# Patient Record
Sex: FEMALE | Race: WHITE | URBAN - METROPOLITAN AREA
[De-identification: names, ages, dates, MRNs, and addresses within clinical notes are randomized per-mention and may not be internally consistent; named-entity substitution may affect disease eponyms.]

---

## 2020-10-29 ENCOUNTER — HOSPITAL ENCOUNTER (OUTPATIENT)
Dept: PHYSICAL THERAPY | Age: 50
Setting detail: THERAPIES SERIES
Discharge: HOME | End: 2020-10-29
Payer: COMMERCIAL

## 2020-10-29 PROCEDURE — 97110 THERAPEUTIC EXERCISES: CPT | Performed by: PHYSICAL THERAPIST

## 2020-10-29 PROCEDURE — 97162 PT EVAL MOD COMPLEX 30 MIN: CPT | Performed by: PHYSICAL THERAPIST

## 2020-10-29 NOTE — FLOWSHEET NOTE
stretch 20\" 3 x HEP                     Supine cane flexion  5\"  10 x  HEP   Chin tuck- supine 5\" 10 x  HEP   Supine cervical ext 5\"  10 x  HEP   Cervical supine rotation 5 10 x HEP               Manual Intervention           Cerv mobs/manip      Thoracic mobs/manip      CT manip      Rib mobilizations      STM                  NMR re-education          T-spine Ext- foam roll      Chin tucks                         Traction                                     Therapeutic Exercise and NMR EXR  [x] (03920) Provided verbal/tactile cueing for activities related to strengthening, flexibility, endurance, ROM  for improvements in cervical, postural, scapular, scapulothoracic and UE control with self care, reaching, carrying, lifting, house/yardwork, driving/computer work. [x] (96828) Provided verbal/tactile cueing for activities related to improving balance, coordination, kinesthetic sense, posture, motor skill, proprioception  to assist with cervical, scapular, scapulothoracic and UE control with self care, reaching, carrying, lifting, house/yardwork, driving/computer work. Therapeutic Activities:    [] (55269 or 52142) Provided verbal/tactile cueing for activities related to improving balance, coordination, kinesthetic sense, posture, motor skill, proprioception and motor activation to allow for proper function of cervical, scapular, scapulothoracic and UE control with self care, carrying, lifting, driving/computer work.      Home Exercise Program:    [x] (19380) Reviewed/Progressed HEP activities related to strengthening, flexibility, endurance, ROM of cervical, scapular, scapulothoracic and UE control with self care, reaching, carrying, lifting, house/yardwork, driving/computer work  [] (20516) Reviewed/Progressed HEP activities related to improving balance, coordination, kinesthetic sense, posture, motor skill, proprioception of cervical, scapular, scapulothoracic and UE control with self care, reaching, carrying, lifting, house/yardwork, driving/computer work      Manual Treatments:  PROM / STM / Oscillations-Mobs:  G-I, II, III, IV (PA's, Inf., Post.)  [x] (98195) Provided manual therapy to mobilize soft tissue/joints of cervical/CT, scapular GHJ and UE for the purpose of decreasing headache, modulating pain, promoting relaxation,  increasing ROM, reducing/eliminating soft tissue swelling/inflammation/restriction, improving soft tissue extensibility and allowing for proper ROM for normal function with self care, reaching, carrying, lifting, house/yardwork, driving/computer work    Modalities:  Not today due to time  (patient had to  children)    Charges  Timed Code Treatment Minutes: 20   Total Treatment Minutes: 45       [] EVAL (LOW) 97210   [x] EVAL (MOD) 12710   [] EVAL (HIGH) 27739   [] RE-EVAL     [x] ES(15191) x  1   [] IONTO  [] NMR (26666) x     [] VASO  [] Manual (60993) x      [] Other:  [] TA x      [] Mech Traction (29079)  [] ES(attended) (81496)      [] ES (un) (85641):     GOALS:  Patient stated goal: Decrease in pain   []? Progressing: []? Met: []? Not Met: []? Adjusted     Therapist goals for Patient:   Short Term Goals: To be achieved in: 2 weeks  1. Independent in HEP and progression per patient tolerance, in order to prevent re-injury. []? Progressing: []? Met: []? Not Met: []? Adjusted  2. Patient will have a decrease in pain to facilitate improvement in movement, function, and ADLs as indicated by Functional Deficits. []? Progressing: []? Met: []? Not Met: []? Adjusted     Long Term Goals: To be achieved in: 6 weeks  1. Disability index score of 30% or less for the NDI to assist with reaching prior level of function. []? Progressing: []? Met: []? Not Met: []? Adjusted  2. Patient will demonstrate increased AROM to [facility] of cervical/thoracic spine to allow for proper joint functioning as indicated by patients Functional Deficits. []? Progressing: []? Met: []?  Not Met: []? Adjusted  3. Patient will demonstrate an increase in postural awareness and control and activation of  Deep cervical stabilizers to allow for proper functional mobility as indicated by patients Functional Deficits. []? Progressing: []? Met: []? Not Met: []? Adjusted   4. Patient will return to work and functional activities without increased symptoms or restriction. []? Progressing: []? Met: []? Not Met: []? Adjusted  5. Patient will sleep 6-8 hours without sleep disturbances. []? Progressing: []? Met: []? Not Met: []? Adjusted           Overall Progression Towards Functional goals/ Treatment Progress Update:  [] Patient is progressing as expected towards functional goals listed. [] Progression is slowed due to complexities/Impairments listed. [] Progression has been slowed due to co-morbidities. [x] Plan just implemented, too soon to assess goals progression <30days   [] Goals require adjustment due to lack of progress  [] Patient is not progressing as expected and requires additional follow up with physician  [] Other    Prognosis for POC: [x] Good [] Fair  [] Poor      Patient requires continued skilled intervention: [x] Yes  [] No      ASSESSMENT:  See eval    Treatment/Activity Tolerance:  [] Patient tolerated treatment well [] Patient limited by fatique  [x] Patient limited by pain  [] Patient limited by other medical complications  [] Other:     Prognosis: [] Good [x] Fair  [] Poor    Patient Requires Follow-up: [x] Yes  [] No    PLAN: See eval  [] Continue per plan of care [] Alter current plan (see comments above)  [x] Plan of care initiated [] Hold pending MD visit [] Discharge      Electronically signed by:  Kenny Flores PT     Note: If patient does not return for scheduled/ recommended follow up visits, this note will serve as a discharge from care along with most recent update on progress.
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
## 2020-11-02 ENCOUNTER — HOSPITAL ENCOUNTER (OUTPATIENT)
Dept: PHYSICAL THERAPY | Age: 50
Setting detail: THERAPIES SERIES
Discharge: HOME | End: 2020-11-02
Payer: COMMERCIAL

## 2020-11-02 PROCEDURE — 97110 THERAPEUTIC EXERCISES: CPT | Performed by: PHYSICAL THERAPIST

## 2020-11-02 PROCEDURE — G0283 ELEC STIM OTHER THAN WOUND: HCPCS | Performed by: PHYSICAL THERAPIST

## 2020-11-02 PROCEDURE — 97140 MANUAL THERAPY 1/> REGIONS: CPT | Performed by: PHYSICAL THERAPIST

## 2020-11-02 NOTE — FLOWSHEET NOTE
[facility],  [address]  Phone: [phone]  Fax [phone]      Physical Therapy Treatment Note/ Progress Report:       Date:  2020    Patient Name:  Marisela Richter    :  1970  MRN: [id number]  Restrictions/Precautions:    Medical/Treatment Diagnosis Information:  · Diagnosis: S16.1xxA  Cervical Strain, Muscle Fasica, tendons  · Treatment Diagnosis: Neck Pain H99.1  Insurance/Certification information:  PT Insurance Information: [employer] $70CP, Auth after 10  Physician Information:  Referring Practitioner: Char Paredes NP;  [facility]  Has the plan of care been signed (Y/N):        []  Yes  [x]  No     Date of Patient follow up with Physician:     Is this a Progress Report:     []  Yes  [x]  No        If Yes:  Date Range for reporting period:  Beginning 10/29/20  Ending    Progress report will be due (10 Rx or 30 days whichever is less):        Recertification will be due (POC Duration  / 90 days whichever is less): 6 week POC  12/10/20       Visit # Insurance Allowable Auth Required   In-person 2 10 (then auth) [x]  Yes []  No    Telehealth   []  Yes []  No    Total        Functional Scale: NDI 66%    Date assessed:  10/29/20   Therapy Diagnosis/Practice Pattern:F      Number of Comorbidities:  []0     []1-2    [x]3+     Latex Allergy:  [x]NO      []YES  Preferred Language for Healthcare:   [x]English       []other:    Pain level:  6-7/10     SUBJECTIVE:  Base of neck sore Friday. Hard to touch rest of the day. Symptoms remain the same otherwise. OBJECTIVE: See eval   Observation:    Test measurements:      RESTRICTIONS/PRECAUTIONS: 2009 Cervical Fusion;  Ehler's Danlos Syndrome (meds), HTN (controlled), Mitral valve Prolaspse, RA, LATEX ALLERGY    Exercises/Interventions:   Therapeutic Ex        Sets/sec Reps Notes   UBE      T- band Row/pinch RTB 15 x  Latex free T- band lower pinch RTB 15 x  Latex free    T- band ER activation with scap retract RTB 5\"  15 x  HEP   UT stretch 20\" 3 x HEP   Levator stretch 20\" 3 x HEP         Supine SA punch  20 x     Supine Horz ABD RTB 15 x  Latex free    Supine cane flexion  5\"  10 x  HEP   Chin tuck- supine 5\" 10 x  HEP   Supine cervical ext over towel roll 5\"  10 x  HEP   Cervical supine rotation 5 10 x HEP               Manual Intervention           Suboccipital massage/ release  X 3 min     STM to upper trap, levators, paraspinals  X 4 min     Cervical stretches  X 3 min     Thoracic spine mobs - prone  nv                       NMR re-education                                        Traction                                     Therapeutic Exercise and NMR EXR  [x] (30135) Provided verbal/tactile cueing for activities related to strengthening, flexibility, endurance, ROM  for improvements in cervical, postural, scapular, scapulothoracic and UE control with self care, reaching, carrying, lifting, house/yardwork, driving/computer work. [x] (85665) Provided verbal/tactile cueing for activities related to improving balance, coordination, kinesthetic sense, posture, motor skill, proprioception  to assist with cervical, scapular, scapulothoracic and UE control with self care, reaching, carrying, lifting, house/yardwork, driving/computer work. Therapeutic Activities:    [] (25192 or 56316) Provided verbal/tactile cueing for activities related to improving balance, coordination, kinesthetic sense, posture, motor skill, proprioception and motor activation to allow for proper function of cervical, scapular, scapulothoracic and UE control with self care, carrying, lifting, driving/computer work.      Home Exercise Program:    [x] (03939) Reviewed/Progressed HEP activities related to strengthening, flexibility, endurance, ROM of cervical, scapular, scapulothoracic and UE control with self care, reaching, carrying, lifting, house/yardwork, driving/computer work  [] (93065) Reviewed/Progressed HEP activities related to improving balance, coordination, kinesthetic sense, posture, motor skill, proprioception of cervical, scapular, scapulothoracic and UE control with self care, reaching, carrying, lifting, house/yardwork, driving/computer work      Manual Treatments:  PROM / STM / Oscillations-Mobs:  G-I, II, III, IV (PA's, Inf., Post.)  [x] (97036) Provided manual therapy to mobilize soft tissue/joints of cervical/CT, scapular GHJ and UE for the purpose of decreasing headache, modulating pain, promoting relaxation,  increasing ROM, reducing/eliminating soft tissue swelling/inflammation/restriction, improving soft tissue extensibility and allowing for proper ROM for normal function with self care, reaching, carrying, lifting, house/yardwork, driving/computer work    Modalities:  PreMod to cervical spine / MHP x 15 minutes prone     Charges  Timed Code Treatment Minutes: 40   Total Treatment Minutes: 55       [] EVAL (LOW) 00271   [] EVAL (MOD) 10960   [] EVAL (HIGH) 59020   [] RE-EVAL     [x] SL(54258) x  2   [] IONTO  [] NMR (54201) x     [] VASO  [x] Manual (50188) x  1    [] Other:  [] TA x      [] Mech Traction (27506)  [] ES(attended) (99699)      [x] ES (un) (00983):     GOALS:  Patient stated goal: Decrease in pain   []? Progressing: []? Met: []? Not Met: []? Adjusted     Therapist goals for Patient:   Short Term Goals: To be achieved in: 2 weeks  1. Independent in HEP and progression per patient tolerance, in order to prevent re-injury. []? Progressing: []? Met: []? Not Met: []? Adjusted  2. Patient will have a decrease in pain to facilitate improvement in movement, function, and ADLs as indicated by Functional Deficits. []? Progressing: []? Met: []? Not Met: []? Adjusted     Long Term Goals: To be achieved in: 6 weeks  1. Disability index score of 30% or less for the NDI to assist with reaching prior level of function. []?  Progressing: []? Met: []? Not Met: []? Adjusted  2. Patient will demonstrate increased AROM to [facility] of cervical/thoracic spine to allow for proper joint functioning as indicated by patients Functional Deficits. []? Progressing: []? Met: []? Not Met: []? Adjusted  3. Patient will demonstrate an increase in postural awareness and control and activation of  Deep cervical stabilizers to allow for proper functional mobility as indicated by patients Functional Deficits. []? Progressing: []? Met: []? Not Met: []? Adjusted   4. Patient will return to work and functional activities without increased symptoms or restriction. []? Progressing: []? Met: []? Not Met: []? Adjusted  5. Patient will sleep 6-8 hours without sleep disturbances. []? Progressing: []? Met: []? Not Met: []? Adjusted           Overall Progression Towards Functional goals/ Treatment Progress Update:  [] Patient is progressing as expected towards functional goals listed. [] Progression is slowed due to complexities/Impairments listed. [] Progression has been slowed due to co-morbidities. [x] Plan just implemented, too soon to assess goals progression <30days   [] Goals require adjustment due to lack of progress  [] Patient is not progressing as expected and requires additional follow up with physician  [] Other    Prognosis for POC: [x] Good [] Fair  [] Poor      Patient requires continued skilled intervention: [x] Yes  [] No      ASSESSMENT:Tolerated additional strengthening well for scapular stabilization. Placed order for TENS unit. Relief with modality in clinic today.      Treatment/Activity Tolerance:  [] Patient tolerated treatment well [] Patient limited by fatique  [x] Patient limited by pain  [] Patient limited by other medical complications  [] Other:     Prognosis: [] Good [x] Fair  [] Poor    Patient Requires Follow-up: [x] Yes  [] No    PLAN: See eval  [x] Continue per plan of care [] Alter current plan (see comments above)  [] Plan of care initiated [] Hold pending MD visit [] Discharge      Electronically signed by:  aJme Bermudez PT     Note: If patient does not return for scheduled/ recommended follow up visits, this note will serve as a discharge from care along with most recent update on progress.

## 2020-11-09 ENCOUNTER — HOSPITAL ENCOUNTER (OUTPATIENT)
Dept: PHYSICAL THERAPY | Age: 50
Setting detail: THERAPIES SERIES
Discharge: HOME | End: 2020-11-09
Payer: COMMERCIAL

## 2020-11-09 PROCEDURE — 97140 MANUAL THERAPY 1/> REGIONS: CPT | Performed by: PHYSICAL THERAPIST

## 2020-11-09 PROCEDURE — 97110 THERAPEUTIC EXERCISES: CPT | Performed by: PHYSICAL THERAPIST

## 2020-11-09 PROCEDURE — G0283 ELEC STIM OTHER THAN WOUND: HCPCS | Performed by: PHYSICAL THERAPIST

## 2020-11-09 NOTE — FLOWSHEET NOTE
[facility],  [address] [place]  Phone: [phone]  Fax [phone]      Physical Therapy Treatment Note/ Progress Report:       Date:  2020    Patient Name:  Cece Boggs    :  1970  MRN: [id number]  Restrictions/Precautions:    Medical/Treatment Diagnosis Information:  · Diagnosis: S16.1xxA  Cervical Strain, Muscle Fasica, tendons  · Treatment Diagnosis: Neck Pain C49.6  Insurance/Certification information:  PT Insurance Information: [employer] $70CP, Auth after 10  Physician Information:  Referring Practitioner: Ciro Davis NP;  [facility]  Has the plan of care been signed (Y/N):        []  Yes  [x]  No     Date of Patient follow up with Physician:     Is this a Progress Report:     []  Yes  [x]  No        If Yes:  Date Range for reporting period:  Beginning 10/29/20  Ending    Progress report will be due (10 Rx or 30 days whichever is less):        Recertification will be due (POC Duration  / 90 days whichever is less): 6 week POC  12/10/20       Visit # Insurance Allowable Auth Required   In-person 3 10 (then auth) [x]  Yes []  No    Telehealth   []  Yes []  No    Total        Functional Scale: NDI 66%    Date assessed:  10/29/20   Therapy Diagnosis/Practice Pattern:F      Number of Comorbidities:  []0     []1-2    [x]3+     Latex Allergy:  [x]NO      []YES  Preferred Language for Healthcare:   [x]English       []other:    Pain level:  4-5/10     SUBJECTIVE:  Feeling less pain in the shoulders and neck, more soreness now. Eager to get TENS unit. Had procedure last week and arm got sore from bed mobility and blood draw, etc.     OBJECTIVE: See eval   Observation:    Test measurements:       RESTRICTIONS/PRECAUTIONS:  Cervical Fusion;  Ehler's Danlos Syndrome (meds), HTN (controlled), Mitral valve Prolaspse, RA, LATEX ALLERGY    Exercises/Interventions:   Therapeutic Ex flexibility, endurance, ROM of cervical, scapular, scapulothoracic and UE control with self care, reaching, carrying, lifting, house/yardwork, driving/computer work  [] (06718) Reviewed/Progressed HEP activities related to improving balance, coordination, kinesthetic sense, posture, motor skill, proprioception of cervical, scapular, scapulothoracic and UE control with self care, reaching, carrying, lifting, house/yardwork, driving/computer work      Manual Treatments:  PROM / STM / Oscillations-Mobs:  G-I, II, III, IV (PA's, Inf., Post.)  [x] (78019) Provided manual therapy to mobilize soft tissue/joints of cervical/CT, scapular GHJ and UE for the purpose of decreasing headache, modulating pain, promoting relaxation,  increasing ROM, reducing/eliminating soft tissue swelling/inflammation/restriction, improving soft tissue extensibility and allowing for proper ROM for normal function with self care, reaching, carrying, lifting, house/yardwork, driving/computer work    Modalities:  PreMod to cervical spine / MHP x 15 minutes prone     Charges  Timed Code Treatment Minutes: 40   Total Treatment Minutes: 55       [] EVAL (LOW) 64379   [] EVAL (MOD) 28093   [] EVAL (HIGH) 17253   [] RE-EVAL     [x] AX(79107) x  2   [] IONTO  [] NMR (19564) x     [] VASO  [x] Manual (40075) x  1    [] Other:  [] TA x      [] Mech Traction (75419)  [] ES(attended) (97087)      [x] ES (un) (08195):     GOALS:  Patient stated goal: Decrease in pain   []? Progressing: []? Met: []? Not Met: []? Adjusted     Therapist goals for Patient:   Short Term Goals: To be achieved in: 2 weeks  1. Independent in HEP and progression per patient tolerance, in order to prevent re-injury. []? Progressing: []? Met: []? Not Met: []? Adjusted  2. Patient will have a decrease in pain to facilitate improvement in movement, function, and ADLs as indicated by Functional Deficits. []? Progressing: []? Met: []? Not Met: []? Adjusted     Long Term Goals:  To be achieved in: 6 weeks  1. Disability index score of 30% or less for the NDI to assist with reaching prior level of function. []? Progressing: []? Met: []? Not Met: []? Adjusted  2. Patient will demonstrate increased AROM to [facility] of cervical/thoracic spine to allow for proper joint functioning as indicated by patients Functional Deficits. []? Progressing: []? Met: []? Not Met: []? Adjusted  3. Patient will demonstrate an increase in postural awareness and control and activation of  Deep cervical stabilizers to allow for proper functional mobility as indicated by patients Functional Deficits. []? Progressing: []? Met: []? Not Met: []? Adjusted   4. Patient will return to work and functional activities without increased symptoms or restriction. []? Progressing: []? Met: []? Not Met: []? Adjusted  5. Patient will sleep 6-8 hours without sleep disturbances. []? Progressing: []? Met: []? Not Met: []? Adjusted           Overall Progression Towards Functional goals/ Treatment Progress Update:  [] Patient is progressing as expected towards functional goals listed. [] Progression is slowed due to complexities/Impairments listed. [] Progression has been slowed due to co-morbidities. [x] Plan just implemented, too soon to assess goals progression <30days   [] Goals require adjustment due to lack of progress  [] Patient is not progressing as expected and requires additional follow up with physician  [] Other    Prognosis for POC: [x] Good [] Fair  [] Poor      Patient requires continued skilled intervention: [x] Yes  [] No      ASSESSMENT:  Overall patient trending in the right direction with less pain and improved mobility of shoulders. Continue to progress as patient tolerates.      Treatment/Activity Tolerance:  [x] Patient tolerated treatment well [] Patient limited by fatique  [] Patient limited by pain  [] Patient limited by other medical complications  [] Other:     Prognosis: [] Good [x] Fair  [] Poor    Patient Requires Follow-up: [x] Yes  [] No    PLAN: See eval  [x] Continue per plan of care [] Alter current plan (see comments above)  [] Plan of care initiated [] Hold pending MD visit [] Discharge      Electronically signed by:  Barry Al PT     Note: If patient does not return for scheduled/ recommended follow up visits, this note will serve as a discharge from care along with most recent update on progress.

## 2020-11-13 ENCOUNTER — HOSPITAL ENCOUNTER (OUTPATIENT)
Dept: PHYSICAL THERAPY | Age: 50
Setting detail: THERAPIES SERIES
Discharge: HOME | End: 2020-11-13
Payer: COMMERCIAL

## 2020-11-13 PROCEDURE — 97110 THERAPEUTIC EXERCISES: CPT | Performed by: PHYSICAL THERAPIST

## 2020-11-13 PROCEDURE — 97140 MANUAL THERAPY 1/> REGIONS: CPT | Performed by: PHYSICAL THERAPIST

## 2020-11-13 PROCEDURE — G0283 ELEC STIM OTHER THAN WOUND: HCPCS | Performed by: PHYSICAL THERAPIST

## 2020-11-13 NOTE — FLOWSHEET NOTE
[facility],  [address]  Phone: [phone]  Fax [phone]      Physical Therapy Treatment Note/ Progress Report:       Date:  2020    Patient Name:  Umberto Escalante    :  1970  MRN: [id number]  Restrictions/Precautions:    Medical/Treatment Diagnosis Information:  · Diagnosis: S16.1xxA  Cervical Strain, Muscle Fasica, tendons  · Treatment Diagnosis: Neck Pain F82.6  Insurance/Certification information:  PT Insurance Information: [employer] $70CP, Auth after 10  Physician Information:  Referring Practitioner: Babatunde Brizuela NP;  [facility]  Has the plan of care been signed (Y/N):        []  Yes  [x]  No     Date of Patient follow up with Physician:     Is this a Progress Report:     []  Yes  [x]  No        If Yes:  Date Range for reporting period:  Beginning 10/29/20  Ending    Progress report will be due (10 Rx or 30 days whichever is less):        Recertification will be due (POC Duration  / 90 days whichever is less): 6 week POC  12/10/20       Visit # Insurance Allowable Auth Required   In-person 4 10 (then auth) [x]  Yes []  No    Telehealth   []  Yes []  No    Total        Functional Scale: NDI 66%    Date assessed:  10/29/20   Therapy Diagnosis/Practice Pattern:F      Number of Comorbidities:  []0     []1-2    [x]3+     Latex Allergy:  [x]NO      []YES  Preferred Language for Healthcare:   [x]English       []other:    Pain level:  4-5/10     SUBJECTIVE:  Patient reports sore of all body today she believes secondary to weather changes. Right sided neck tension persists. Has computer set up correctly. Feels sitting for prolonged periods increases discomfort. OBJECTIVE: See eval   Observation:    Test measurements:       RESTRICTIONS/PRECAUTIONS: 2009 Cervical Fusion;  Ehler's Danlos Syndrome (meds), HTN (controlled), Mitral valve Prolaspse, RA, LATEX ALLERGY    Exercises/Interventions:   Therapeutic Ex        Sets/sec Reps Notes   UBE      T- band Row/pinch RTB 15 x  Latex free   T- band lower pinch RTB 15 x  Latex free    T- band ER activation with scap retract RTB 5\"  20x  HEP   UT stretch 20\" 3 x HEP   Levator stretch 20\" 3 x HEP         Supine SA punch 2#  20 x     Supine tricep ext 2#  20 x     Supine Horz ABD RTB 15 x  Latex free    Supine cane flexion  5\"  10 x  HEP   Chin tuck with lift  5\"  10 x     Chin tuck- supine 5\" 10 x  HEP   Supine cervical ext over towel roll HEP   Cervical supine rotation 5 10 x HEP   Corner stretch  20\"  4 x     Wall walks  10 x           Manual Intervention           Suboccipital massage/ release  X 3 min     STM to upper trap, levators, paraspinals  X 4 min     Cervical stretches  X 3 min     Thoracic spine mobs - prone   x 3 min                        NMR re-education                                        Traction                                     Therapeutic Exercise and NMR EXR  [x] (45886) Provided verbal/tactile cueing for activities related to strengthening, flexibility, endurance, ROM  for improvements in cervical, postural, scapular, scapulothoracic and UE control with self care, reaching, carrying, lifting, house/yardwork, driving/computer work. [x] (22703) Provided verbal/tactile cueing for activities related to improving balance, coordination, kinesthetic sense, posture, motor skill, proprioception  to assist with cervical, scapular, scapulothoracic and UE control with self care, reaching, carrying, lifting, house/yardwork, driving/computer work. Therapeutic Activities:    [] (64793 or 79819) Provided verbal/tactile cueing for activities related to improving balance, coordination, kinesthetic sense, posture, motor skill, proprioception and motor activation to allow for proper function of cervical, scapular, scapulothoracic and UE control with self care, carrying, lifting, driving/computer work.      Home Exercise Program:    [x] (09853) Reviewed/Progressed HEP activities related to strengthening, flexibility, endurance, ROM of cervical, scapular, scapulothoracic and UE control with self care, reaching, carrying, lifting, house/yardwork, driving/computer work  [] (53008) Reviewed/Progressed HEP activities related to improving balance, coordination, kinesthetic sense, posture, motor skill, proprioception of cervical, scapular, scapulothoracic and UE control with self care, reaching, carrying, lifting, house/yardwork, driving/computer work      Manual Treatments:  PROM / STM / Oscillations-Mobs:  G-I, II, III, IV (PA's, Inf., Post.)  [x] (55569) Provided manual therapy to mobilize soft tissue/joints of cervical/CT, scapular GHJ and UE for the purpose of decreasing headache, modulating pain, promoting relaxation,  increasing ROM, reducing/eliminating soft tissue swelling/inflammation/restriction, improving soft tissue extensibility and allowing for proper ROM for normal function with self care, reaching, carrying, lifting, house/yardwork, driving/computer work    Modalities:  PreMod to cervical spine / MHP x 15 minutes prone     Charges  Timed Code Treatment Minutes: 40   Total Treatment Minutes: 55       [] EVAL (LOW) 42739   [] EVAL (MOD) 61134   [] EVAL (HIGH) 79008   [] RE-EVAL     [x] DY(29538) x  2   [] IONTO  [] NMR (46560) x     [] VASO  [x] Manual (86540) x  1    [] Other:  [] TA x      [] Mech Traction (95512)  [] ES(attended) (83530)      [x] ES (un) (56143):     GOALS:  Patient stated goal: Decrease in pain   []? Progressing: []? Met: []? Not Met: []? Adjusted     Therapist goals for Patient:   Short Term Goals: To be achieved in: 2 weeks  1. Independent in HEP and progression per patient tolerance, in order to prevent re-injury. [x]? Progressing: []? Met: []? Not Met: []? Adjusted  2.  Patient will have a decrease in pain to facilitate improvement in movement, function, and ADLs as indicated by Functional

## 2020-11-16 ENCOUNTER — HOSPITAL ENCOUNTER (OUTPATIENT)
Dept: PHYSICAL THERAPY | Age: 50
Setting detail: THERAPIES SERIES
Discharge: HOME | End: 2020-11-16
Payer: COMMERCIAL

## 2020-11-16 PROCEDURE — G0283 ELEC STIM OTHER THAN WOUND: HCPCS | Performed by: PHYSICAL THERAPIST

## 2020-11-16 PROCEDURE — 97140 MANUAL THERAPY 1/> REGIONS: CPT | Performed by: PHYSICAL THERAPIST

## 2020-11-16 PROCEDURE — 97110 THERAPEUTIC EXERCISES: CPT | Performed by: PHYSICAL THERAPIST

## 2020-11-16 NOTE — FLOWSHEET NOTE
endurance, ROM of cervical, scapular, scapulothoracic and UE control with self care, reaching, carrying, lifting, house/yardwork, driving/computer work  [] (95979) Reviewed/Progressed HEP activities related to improving balance, coordination, kinesthetic sense, posture, motor skill, proprioception of cervical, scapular, scapulothoracic and UE control with self care, reaching, carrying, lifting, house/yardwork, driving/computer work      Manual Treatments:  PROM / STM / Oscillations-Mobs:  G-I, II, III, IV (PA's, Inf., Post.)  [x] (79082) Provided manual therapy to mobilize soft tissue/joints of cervical/CT, scapular GHJ and UE for the purpose of decreasing headache, modulating pain, promoting relaxation,  increasing ROM, reducing/eliminating soft tissue swelling/inflammation/restriction, improving soft tissue extensibility and allowing for proper ROM for normal function with self care, reaching, carrying, lifting, house/yardwork, driving/computer work    Modalities:  PreMod to cervical spine / MHP x 15 minutes prone     Charges  Timed Code Treatment Minutes: 35   Total Treatment Minutes: 50       [] EVAL (LOW) 77280   [] EVAL (MOD) 77666   [] EVAL (HIGH) 62432   [] RE-EVAL     [x] XC(22678) x  1   [] IONTO  [] NMR (96750) x     [] VASO  [x] Manual (00164) x  1    [] Other:  [] TA x      [] Mech Traction (15964)  [] ES(attended) (25087)      [x] ES (un) (15717):     GOALS:  Patient stated goal: Decrease in pain   []? Progressing: []? Met: []? Not Met: []? Adjusted     Therapist goals for Patient:   Short Term Goals: To be achieved in: 2 weeks  1. Independent in HEP and progression per patient tolerance, in order to prevent re-injury. [x]? Progressing: []? Met: []? Not Met: []? Adjusted  2. Patient will have a decrease in pain to facilitate improvement in movement, function, and ADLs as indicated by Functional Deficits. [x]? Progressing: []? Met: []? Not Met: []? Adjusted     Long Term Goals:  To be achieved in: 6 weeks  1. Disability index score of 30% or less for the NDI to assist with reaching prior level of function. []? Progressing: []? Met: []? Not Met: []? Adjusted  2. Patient will demonstrate increased AROM to [facility] of cervical/thoracic spine to allow for proper joint functioning as indicated by patients Functional Deficits. []? Progressing: []? Met: []? Not Met: []? Adjusted  3. Patient will demonstrate an increase in postural awareness and control and activation of  Deep cervical stabilizers to allow for proper functional mobility as indicated by patients Functional Deficits. []? Progressing: []? Met: []? Not Met: []? Adjusted   4. Patient will return to work and functional activities without increased symptoms or restriction. []? Progressing: []? Met: []? Not Met: []? Adjusted  5. Patient will sleep 6-8 hours without sleep disturbances. []? Progressing: []? Met: []? Not Met: []? Adjusted           Overall Progression Towards Functional goals/ Treatment Progress Update:  [x] Patient is progressing as expected towards functional goals listed. [] Progression is slowed due to complexities/Impairments listed. [] Progression has been slowed due to co-morbidities. [] Plan just implemented, too soon to assess goals progression <30days   [] Goals require adjustment due to lack of progress  [] Patient is not progressing as expected and requires additional follow up with physician  [] Other    Prognosis for POC: [x] Good [] Fair  [] Poor      Patient requires continued skilled intervention: [x] Yes  [] No      ASSESSMENT:  Patient has 1 more visit scheduled. Feels ready to discharge to home program following this week. Understands continuation of home program is important for further progression. Patient showed proper use of TENS unit to which should would benefit not only for acute neck pain but would benefit from chronic pain associated with RA and EDS.       Treatment/Activity Tolerance:  [x] Patient tolerated treatment well [] Patient limited by fatique  [] Patient limited by pain  [] Patient limited by other medical complications  [] Other:     Prognosis: [] Good [x] Fair  [] Poor    Patient Requires Follow-up: [x] Yes  [] No    PLAN: See eval  [x] Continue per plan of care [] Alter current plan (see comments above)  [] Plan of care initiated [] Hold pending MD visit [] Discharge      Electronically signed by:  Radha Chung PT     Note: If patient does not return for scheduled/ recommended follow up visits, this note will serve as a discharge from care along with most recent update on progress.

## 2020-11-20 ENCOUNTER — HOSPITAL ENCOUNTER (OUTPATIENT)
Dept: PHYSICAL THERAPY | Age: 50
Setting detail: THERAPIES SERIES
Discharge: HOME | End: 2020-11-20
Payer: COMMERCIAL

## 2020-11-20 PROCEDURE — G0283 ELEC STIM OTHER THAN WOUND: HCPCS | Performed by: PHYSICAL THERAPIST

## 2020-11-20 PROCEDURE — 97530 THERAPEUTIC ACTIVITIES: CPT | Performed by: PHYSICAL THERAPIST

## 2020-11-20 PROCEDURE — 97140 MANUAL THERAPY 1/> REGIONS: CPT | Performed by: PHYSICAL THERAPIST

## 2020-11-20 PROCEDURE — 97110 THERAPEUTIC EXERCISES: CPT | Performed by: PHYSICAL THERAPIST

## 2020-11-20 NOTE — OP NOTE
[facility], [address]  Phone: [phone]  Fax [phone]    Date: 11/20/2020  Physician: Solitario Islas NP  Patient: Ceci Hernandes   Diagnosis: S16.1xxA  Cervical Strain, Muscle Fasica, tendons    Patient has received 6 sessions of Physical Therapy over a 3-4 week period. Functional Questionnaire Score: Initial 66%, Current 46%  Pain reported has decreased from 6-7/10 to 4-5/10 past week; today 7/10    ROM Initial (R) Initial (L) Current (R) Current (L)   Cervical flexion  10  30    Cervical ext 38  60                         Strength       Right shoulder flexion 4 - 4+ 4 4+   scaption 4 - 4+ 4 4+   ER 4 - 4+ 5 5   IR 4+ 4 - 4+ 5   Wrist flex/ ext 4 / 4 4 -/ 4 - 4+/4+ 5/5   Thumb abd 4 4 - 4 4+     Functional Activity Checklist: The patient continues to have moderate difficulty with the following:   [] Personal care  [] Reaching / overhead  [] Standing    [] Housework chores  [] Climbing  [] Driving / riding in a vehicle    [] Work  [] Squatting  [] Bed / vehicle mobility    [] Lifting  [] Walking  [] Sleeping    [] Pushing / pulling  [] Sitting  [] Concentrating / reading     Specific Functional Improvement and Impression:  Patient had been progressing well with therapy along with modalities, soft tissue work, cervical and scapular stabilization, review of posture, and incorporating home program along with use of home TENS unit for continued strengthening and pain modulation. Patient had been reports less pain, less headaches and improved strength. This past week patient has reported increase in right base of skull pain consistent with suboccipital pain and tension. Program was modified due to increase of pain. Patient educated on anatomy and holding on some aggressive strengthening while decrease current flare up of inflammation. Patient would benefit from additional visits to decrease recent flare up.

## 2020-11-20 NOTE — FLOWSHEET NOTE
[facility], [address]  Phone: [phone]  Fax [phone]      Physical Therapy Treatment Note/ Progress Report:       Date:  2020    Patient Name:  Lily Sanchez    :  1970  MRN: [id number]  Restrictions/Precautions:    Medical/Treatment Diagnosis Information:  · Diagnosis: S16.1xxA  Cervical Strain, Muscle Fasica, tendons  · Treatment Diagnosis: Neck Pain B60.9  Insurance/Certification information:  PT Insurance Information: [employer] $70CP, Auth after 10  Physician Information:  Referring Practitioner: Tamiko Fontana NP;  [facility]  Has the plan of care been signed (Y/N):        []  Yes  [x]  No     Date of Patient follow up with Physician:     Is this a Progress Report:     []  Yes  [x]  No        If Yes:  Date Range for reporting period:  Beginning 10/29/20  Ending    Progress report will be due (10 Rx or 30 days whichever is less):        Recertification will be due (POC Duration  / 90 days whichever is less): 6 week POC  12/10/20       Visit # Insurance Allowable Auth Required   In-person 6 10 (then auth) [x]  Yes []  No    Telehealth   []  Yes []  No    Total        Functional Scale: NDI 66%    Date assessed:  10/29/20   Therapy Diagnosis/Practice Pattern:F      Number of Comorbidities:  []0     []1-2    [x]3+     Latex Allergy:  [x]NO      []YES  Preferred Language for Healthcare:   [x]English       []other:    Pain level:  4-5/10     SUBJECTIVE:  Patient reports increase in right sided neck pain since last session. Has not been able to do any exercises this week. Intensity of TENS unit is too much. Patient reports needing to lie down for 30 minutes 2 x day. Patient reports pain base of skull has intensified. OBJECTIVE: See eval   Observation:    Test measurements:       RESTRICTIONS/PRECAUTIONS: 2009 Cervical Fusion;  Ehler's Danlos Syndrome (meds), HTN (controlled), Mitral valve Prolaspse, RA, LATEX ALLERGY    Exercises/Interventions:   Therapeutic Ex        Sets/sec Reps Notes   UBE      T- band Row/pinch Latex free   T- band lower pinch Latex free    T- band ER activation with scap retract RTB 5\"  20x  HEP   UT stretch 20\" 3 x HEP   Levator stretch 20\" 3 x HEP   SBS 5\"  10 x     Supine SA punch    Supine tricep ext    Supine Horz ABD Latex free    Supine cane flexion  HEP   Chin tuck with lift     Chin tuck- supine 5\" 10 x  HEP   Supine cervical ext over towel roll 5\"  10 x  HEP   Cervical supine rotation HEP   Corner stretch  N/t into the hand   Wall walks  10 x           Manual Intervention           Suboccipital massage/ release  X 5 min     STM to upper trap, levators, paraspinals  X 4 min     Cervical stretches  X 3 min     Thoracic spine mobs - prone                           NMR re-education          Patient education  X 15 minutes  TENS use/ education, anatomy of suboccipitals, expectation of therapy, updated measurements, etc                             Traction                                     Therapeutic Exercise and NMR EXR  [x] (32267) Provided verbal/tactile cueing for activities related to strengthening, flexibility, endurance, ROM  for improvements in cervical, postural, scapular, scapulothoracic and UE control with self care, reaching, carrying, lifting, house/yardwork, driving/computer work. [x] (95801) Provided verbal/tactile cueing for activities related to improving balance, coordination, kinesthetic sense, posture, motor skill, proprioception  to assist with cervical, scapular, scapulothoracic and UE control with self care, reaching, carrying, lifting, house/yardwork, driving/computer work.     Therapeutic Activities:    [] (78608 or 00937) Provided verbal/tactile cueing for activities related to improving balance, coordination, kinesthetic sense, posture, motor skill, proprioception and motor activation to allow for proper function of cervical, scapular, scapulothoracic and UE control with self care, carrying, lifting, driving/computer work. Home Exercise Program:    [x] (33829) Reviewed/Progressed HEP activities related to strengthening, flexibility, endurance, ROM of cervical, scapular, scapulothoracic and UE control with self care, reaching, carrying, lifting, house/yardwork, driving/computer work  [] (86490) Reviewed/Progressed HEP activities related to improving balance, coordination, kinesthetic sense, posture, motor skill, proprioception of cervical, scapular, scapulothoracic and UE control with self care, reaching, carrying, lifting, house/yardwork, driving/computer work      Manual Treatments:  PROM / STM / Oscillations-Mobs:  G-I, II, III, IV (PA's, Inf., Post.)  [x] (99437) Provided manual therapy to mobilize soft tissue/joints of cervical/CT, scapular GHJ and UE for the purpose of decreasing headache, modulating pain, promoting relaxation,  increasing ROM, reducing/eliminating soft tissue swelling/inflammation/restriction, improving soft tissue extensibility and allowing for proper ROM for normal function with self care, reaching, carrying, lifting, house/yardwork, driving/computer work    Modalities:  PreMod to cervical spine / CP x 15 minutes prone     Charges  Timed Code Treatment Minutes: 40   Total Treatment Minutes: 55       [] EVAL (LOW) 65988   [] EVAL (MOD) 68958   [] EVAL (HIGH) 21045   [] RE-EVAL     [x] OD(27377) x  1   [] IONTO  [] NMR (30883) x     [] VASO  [x] Manual (19602) x  1    [] Other:  [x] TA x  x   [] [facility] Traction (81885)  [] ES(attended) (77179)      [x] ES (un) (41744):     GOALS:  Patient stated goal: Decrease in pain   []? Progressing: []? Met: []? Not Met: []? Adjusted     Therapist goals for Patient:   Short Term Goals: To be achieved in: 2 weeks  1. Independent in HEP and progression per patient tolerance, in order to prevent re-injury. [x]? Progressing: []? Met: []? Not Met: []? Adjusted  2.

## 2020-11-23 ENCOUNTER — APPOINTMENT (OUTPATIENT)
Dept: PHYSICAL THERAPY | Age: 50
End: 2020-11-23
Payer: COMMERCIAL

## 2020-12-04 ENCOUNTER — HOSPITAL ENCOUNTER (OUTPATIENT)
Dept: PHYSICAL THERAPY | Age: 50
Setting detail: THERAPIES SERIES
Discharge: HOME | End: 2020-12-04
Payer: COMMERCIAL

## 2020-12-04 PROCEDURE — G0283 ELEC STIM OTHER THAN WOUND: HCPCS | Performed by: PHYSICAL THERAPIST

## 2020-12-04 PROCEDURE — 97110 THERAPEUTIC EXERCISES: CPT | Performed by: PHYSICAL THERAPIST

## 2020-12-04 PROCEDURE — 97140 MANUAL THERAPY 1/> REGIONS: CPT | Performed by: PHYSICAL THERAPIST

## 2020-12-04 NOTE — FLOWSHEET NOTE
[facility], [address]  Phone: [phone]  Fax [phone]      Physical Therapy Treatment Note/ Progress Report:       Date:  2020    Patient Name:  Lily Sanchez    :  1970  MRN: [id number]  Restrictions/Precautions:    Medical/Treatment Diagnosis Information:  · Diagnosis: S16.1xxA  Cervical Strain, Muscle Fasica, tendons  · Treatment Diagnosis: Neck Pain E81.1  Insurance/Certification information:  PT Insurance Information: [employer] $70CP, Auth after 10  Physician Information:  Referring Practitioner: Tamiko Fontana NP;  [facility]  Has the plan of care been signed (Y/N):        []  Yes  [x]  No     Date of Patient follow up with Physician:     Is this a Progress Report:     []  Yes  [x]  No        If Yes:  Date Range for reporting period:  Beginning 10/29/20  Ending    Progress report will be due (10 Rx or 30 days whichever is less): 77       Recertification will be due (POC Duration  / 90 days whichever is less): 6 week POC  12/10/20       Visit # Insurance Allowable Auth Required   In-person 7 10 (then auth) [x]  Yes []  No    Telehealth   []  Yes []  No    Total        Functional Scale: NDI 66%    Date assessed:  10/29/20   Therapy Diagnosis/Practice Pattern:F      Number of Comorbidities:  []0     []1-2    [x]3+     Latex Allergy:  [x]NO      []YES  Preferred Language for Healthcare:   [x]English       []other:    Pain level:  5-6/10     SUBJECTIVE:  Less tender on right side of neck. Minimal headaches, more sinus related. Supine punches seemed to irritate suboccipital area. Feels she is back to level prior before flare up. OBJECTIVE: See eval   Observation:    Test measurements:       RESTRICTIONS/PRECAUTIONS: 2009 Cervical Fusion;  Ehler's Danlos Syndrome (meds), HTN (controlled), Mitral valve Prolaspse, RA, LATEX ALLERGY    Exercises/Interventions: Therapeutic Ex        Sets/sec Reps Notes         T- band Row/pinch RTB 20 x  Latex free   T- band lower pinch RTB 20 x  Latex free    T- band ER activation with scap retract RTB 5\"  20x  HEP   UT stretch 20\" 3 x HEP   Levator stretch 20\" 3 x HEP   SBS 5\"  10 x     Supine SA punch    Supine tricep ext    Supine PNF RTB   15 x     Supine Horz ABD RTB 15 x  Latex free    Supine cane flexion  HEP   Chin tuck with lift  5\"  10 x     Chin tuck- supine 5\" 10 x  HEP   Supine cervical ext over towel roll HEP   Cervical supine rotation HEP   Corner stretch  N/t into the hand   Wall walks  10 x     Wall push up   10 x           Manual Intervention           Suboccipital massage/ release  X 4 min     STM to upper trap, levators, paraspinals  X 4 min     Cervical stretches  X 3 min     Thoracic spine mobs - prone         Shoulder right PROM  X 2 min     Submax Side bending manual resistance  bilat 5\"  10 x each side           NMR re-education                                  Traction                                     Therapeutic Exercise and NMR EXR  [x] (26535) Provided verbal/tactile cueing for activities related to strengthening, flexibility, endurance, ROM  for improvements in cervical, postural, scapular, scapulothoracic and UE control with self care, reaching, carrying, lifting, house/yardwork, driving/computer work. [x] (96634) Provided verbal/tactile cueing for activities related to improving balance, coordination, kinesthetic sense, posture, motor skill, proprioception  to assist with cervical, scapular, scapulothoracic and UE control with self care, reaching, carrying, lifting, house/yardwork, driving/computer work.     Therapeutic Activities:    [] (49641 or 93581) Provided verbal/tactile cueing for activities related to improving balance, coordination, kinesthetic sense, posture, motor skill, proprioception and motor activation to allow for proper function of cervical, scapular, scapulothoracic and UE control with self care, carrying, lifting, driving/computer work. Home Exercise Program:    [x] (41681) Reviewed/Progressed HEP activities related to strengthening, flexibility, endurance, ROM of cervical, scapular, scapulothoracic and UE control with self care, reaching, carrying, lifting, house/yardwork, driving/computer work  [] (31784) Reviewed/Progressed HEP activities related to improving balance, coordination, kinesthetic sense, posture, motor skill, proprioception of cervical, scapular, scapulothoracic and UE control with self care, reaching, carrying, lifting, house/yardwork, driving/computer work      Manual Treatments:  PROM / STM / Oscillations-Mobs:  G-I, II, III, IV (PA's, Inf., Post.)  [x] (40189) Provided manual therapy to mobilize soft tissue/joints of cervical/CT, scapular GHJ and UE for the purpose of decreasing headache, modulating pain, promoting relaxation,  increasing ROM, reducing/eliminating soft tissue swelling/inflammation/restriction, improving soft tissue extensibility and allowing for proper ROM for normal function with self care, reaching, carrying, lifting, house/yardwork, driving/computer work    Modalities:  PreMod to cervical spine / CP x 15 minutes prone     Charges  Timed Code Treatment Minutes: 40   Total Treatment Minutes: 55       [] EVAL (LOW) 69391   [] EVAL (MOD) 61463   [] EVAL (HIGH) 72096   [] RE-EVAL     [x] TR(41605) x  2  [] IONTO  [] NMR (18140) x     [] VASO  [x] Manual (27519) x  1    [] Other:  [] TA x  x   [] Mech Traction (32398)  [] ES(attended) (63132)      [x] ES (un) (53840):     GOALS:  Patient stated goal: Decrease in pain   []? Progressing: []? Met: []? Not Met: []? Adjusted     Therapist goals for Patient:   Short Term Goals: To be achieved in: 2 weeks  1. Independent in HEP and progression per patient tolerance, in order to prevent re-injury. [x]? Progressing: []? Met: []? Not Met: []? Adjusted  2.  Patient will have a decrease in pain to facilitate improvement in movement, function, and ADLs as indicated by Functional Deficits. [x]? Progressing: []? Met: []? Not Met: []? Adjusted     Long Term Goals: To be achieved in: 6 weeks  1. Disability index score of 30% or less for the NDI to assist with reaching prior level of function. []? Progressing: []? Met: []? Not Met: []? Adjusted  2. Patient will demonstrate increased AROM to [facility] of cervical/thoracic spine to allow for proper joint functioning as indicated by patients Functional Deficits. []? Progressing: []? Met: []? Not Met: []? Adjusted  3. Patient will demonstrate an increase in postural awareness and control and activation of  Deep cervical stabilizers to allow for proper functional mobility as indicated by patients Functional Deficits. []? Progressing: []? Met: []? Not Met: []? Adjusted   4. Patient will return to work and functional activities without increased symptoms or restriction. []? Progressing: []? Met: []? Not Met: []? Adjusted  5. Patient will sleep 6-8 hours without sleep disturbances. []? Progressing: []? Met: []? Not Met: []? Adjusted           Overall Progression Towards Functional goals/ Treatment Progress Update:  [x] Patient is progressing as expected towards functional goals listed. [] Progression is slowed due to complexities/Impairments listed. [] Progression has been slowed due to co-morbidities. [] Plan just implemented, too soon to assess goals progression <30days   [] Goals require adjustment due to lack of progress  [] Patient is not progressing as expected and requires additional follow up with physician  [] Other    Prognosis for POC: [x] Good [] Fair  [] Poor      Patient requires continued skilled intervention: [x] Yes  [] No      ASSESSMENT:  Tolerated session without any reports of pain only fatigue following. Patient with less discomfort and tension of suboccipitals today with manuals.   Cont 1 x week for 3 more weeks working on stabilization of cervical spine and UE strengthening     Treatment/Activity Tolerance:  [x] Patient tolerated treatment well [] Patient limited by fatique  [] Patient limited by pain  [] Patient limited by other medical complications  [] Other:     Prognosis: [] Good [x] Fair  [] Poor    Patient Requires Follow-up: [x] Yes  [] No    PLAN: 1-2 x / 2-3 weeks   [x] Continue per plan of care [x] Alter current plan (see comments above)  [] Plan of care initiated [] Hold pending MD visit [] Discharge      Electronically signed by:  Julian Cohen PT     Note: If patient does not return for scheduled/ recommended follow up visits, this note will serve as a discharge from care along with most recent update on progress.

## 2020-12-11 ENCOUNTER — HOSPITAL ENCOUNTER (OUTPATIENT)
Dept: PHYSICAL THERAPY | Age: 50
Setting detail: THERAPIES SERIES
Discharge: HOME | End: 2020-12-11
Payer: COMMERCIAL

## 2020-12-11 PROCEDURE — 97140 MANUAL THERAPY 1/> REGIONS: CPT | Performed by: PHYSICAL THERAPIST

## 2020-12-11 PROCEDURE — 97110 THERAPEUTIC EXERCISES: CPT | Performed by: PHYSICAL THERAPIST

## 2020-12-11 PROCEDURE — G0283 ELEC STIM OTHER THAN WOUND: HCPCS | Performed by: PHYSICAL THERAPIST

## 2020-12-11 NOTE — FLOWSHEET NOTE
[facility], [address] [place]  Phone: [phone]  Fax [phone]      Physical Therapy Treatment Note/ Progress Report:       Date:  2020    Patient Name:  [facility]    :  1970  MRN: [id number]  Restrictions/Precautions:    Medical/Treatment Diagnosis Information:  · Diagnosis: S16.1xxA  Cervical Strain, Muscle Fasica, tendons  · Treatment Diagnosis: Neck Pain N35.3  Insurance/Certification information:  PT Insurance Information: [employer] $70CP, Auth after 10  Physician Information:  Referring Practitioner: Deirdre Ruiz NP;  [facility]  Has the plan of care been signed (Y/N):        []  Yes  [x]  No     Date of Patient follow up with Physician:     Is this a Progress Report:     []  Yes  [x]  No        If Yes:  Date Range for reporting period:  Beginning 10/29/20  Ending    Progress report will be due (10 Rx or 30 days whichever is less):        Recertification will be due (POC Duration  / 90 days whichever is less): 6 week POC  12/10/20       Visit # Insurance Allowable Auth Required   In-person 8 10 (then auth) [x]  Yes []  No    Telehealth   []  Yes []  No    Total        Functional Scale: NDI 66%    Date assessed:  10/29/20   Therapy Diagnosis/Practice Pattern:F      Number of Comorbidities:  []0     []1-2    [x]3+     Latex Allergy:  [x]NO      []YES  Preferred Language for Healthcare:   [x]English       []other:    Pain level:  4-5/10     SUBJECTIVE:  Patient reports neck feeling better this week. Some sinus related headache but otherwise doing well. Pain is pretty normal for her she states. OBJECTIVE: See eval   Observation:    Test measurements:       RESTRICTIONS/PRECAUTIONS: 2009 Cervical Fusion;  Ehler's Danlos Syndrome (meds), HTN (controlled), Mitral valve Prolaspse, RA, LATEX ALLERGY    Exercises/Interventions:   Therapeutic Ex        Sets/sec Reps Notes Tband tricep ext RTB 20 x     T- band Row/pinch RTB 20 x  Latex free   T- band lower pinch RTB 20 x  Latex free    T- band ER activation with scap retract RTB 5\"  20x  HEP   UT stretch 20\" 3 x HEP   Levator stretch 20\" 3 x HEP   SBS 5\"  10 x     Supine SA punch    Supine tricep ext    Supine PNF RTB   15 x     Supine Horz ABD RTB 15 x  Latex free    Supine cane flexion  HEP   Chin tuck with lift  5\"  10 x     Chin tuck- supine 5\" 10 x  HEP   Supine cervical ext over towel roll HEP   Cervical supine rotation HEP   Corner stretch  N/t into the hand   Wall walks  10 x     Wall push up   15 x           Manual Intervention           Suboccipital massage/ release  X 4 min     STM to upper trap, levators, paraspinals  X 4 min     Cervical stretches  X 3 min     Thoracic spine mobs - prone         Shoulder right PROM  X 2 min     Submax Side bending manual resistance  bilat 5\"  10 x each side           NMR re-education                                  Traction                                     Therapeutic Exercise and NMR EXR  [x] (80437) Provided verbal/tactile cueing for activities related to strengthening, flexibility, endurance, ROM  for improvements in cervical, postural, scapular, scapulothoracic and UE control with self care, reaching, carrying, lifting, house/yardwork, driving/computer work. [x] (85951) Provided verbal/tactile cueing for activities related to improving balance, coordination, kinesthetic sense, posture, motor skill, proprioception  to assist with cervical, scapular, scapulothoracic and UE control with self care, reaching, carrying, lifting, house/yardwork, driving/computer work.     Therapeutic Activities:    [] (33235 or 75039) Provided verbal/tactile cueing for activities related to improving balance, coordination, kinesthetic sense, posture, motor skill, proprioception and motor activation to allow for proper function of cervical, scapular, scapulothoracic and UE control with self care, carrying, lifting, driving/computer work. Home Exercise Program:    [x] (52359) Reviewed/Progressed HEP activities related to strengthening, flexibility, endurance, ROM of cervical, scapular, scapulothoracic and UE control with self care, reaching, carrying, lifting, house/yardwork, driving/computer work  [] (35354) Reviewed/Progressed HEP activities related to improving balance, coordination, kinesthetic sense, posture, motor skill, proprioception of cervical, scapular, scapulothoracic and UE control with self care, reaching, carrying, lifting, house/yardwork, driving/computer work      Manual Treatments:  PROM / STM / Oscillations-Mobs:  G-I, II, III, IV (PA's, Inf., Post.)  [x] (15515) Provided manual therapy to mobilize soft tissue/joints of cervical/CT, scapular GHJ and UE for the purpose of decreasing headache, modulating pain, promoting relaxation,  increasing ROM, reducing/eliminating soft tissue swelling/inflammation/restriction, improving soft tissue extensibility and allowing for proper ROM for normal function with self care, reaching, carrying, lifting, house/yardwork, driving/computer work    Modalities:  PreMod to cervical spine / CP x 15 minutes prone     Charges  Timed Code Treatment Minutes: 40   Total Treatment Minutes: 55       [] EVAL (LOW) 29570   [] EVAL (MOD) 63237   [] EVAL (HIGH) 23192   [] RE-EVAL     [x] MW(72601) x  2  [] IONTO  [] NMR (99991) x     [] VASO  [x] Manual (76269) x  1    [] Other:  [] TA x  x   [] Mech Traction (27101)  [] ES(attended) (40631)      [x] ES (un) (84610):     GOALS:  Patient stated goal: Decrease in pain   []? Progressing: []? Met: []? Not Met: []? Adjusted     Therapist goals for Patient:   Short Term Goals: To be achieved in: 2 weeks  1. Independent in HEP and progression per patient tolerance, in order to prevent re-injury. []? Progressing: [x]? Met: []? Not Met: []? Adjusted  2.  Patient will have a decrease in pain to facilitate improvement in movement, function, and ADLs as indicated by Functional Deficits. [x]? Progressing: []? Met: []? Not Met: []? Adjusted     Long Term Goals: To be achieved in: 6 weeks  1. Disability index score of 30% or less for the NDI to assist with reaching prior level of function. []? Progressing: []? Met: []? Not Met: []? Adjusted  2. Patient will demonstrate increased AROM to [facility] of cervical/thoracic spine to allow for proper joint functioning as indicated by patients Functional Deficits. []? Progressing: [x]? Met: []? Not Met: []? Adjusted  3. Patient will demonstrate an increase in postural awareness and control and activation of  Deep cervical stabilizers to allow for proper functional mobility as indicated by patients Functional Deficits. [x]? Progressing: []? Met: []? Not Met: []? Adjusted   4. Patient will return to work and functional activities without increased symptoms or restriction. [x]? Progressing: []? Met: []? Not Met: []? Adjusted  5. Patient will sleep 6-8 hours without sleep disturbances. []? Progressing: [x]? Met: []? Not Met: []? Adjusted           Overall Progression Towards Functional goals/ Treatment Progress Update:  [x] Patient is progressing as expected towards functional goals listed. [] Progression is slowed due to complexities/Impairments listed. [] Progression has been slowed due to co-morbidities. [] Plan just implemented, too soon to assess goals progression <30days   [] Goals require adjustment due to lack of progress  [] Patient is not progressing as expected and requires additional follow up with physician  [] Other    Prognosis for POC: [x] Good [] Fair  [] Poor      Patient requires continued skilled intervention: [x] Yes  [] No      ASSESSMENT:  Tolerated session without any reports of pain only fatigue following. continue working on stabilization of cervical spine and UE strengthening  1 more visit progressing patient to home program disharge.      Treatment/Activity Tolerance:  [x] Patient tolerated treatment well [] Patient limited by fatique  [] Patient limited by pain  [] Patient limited by other medical complications  [] Other:     Prognosis: [] Good [x] Fair  [] Poor    Patient Requires Follow-up: [x] Yes  [] No    PLAN: 1-2 x / 2-3 weeks   [x] Continue per plan of care [x] Alter current plan (see comments above)  [] Plan of care initiated [] Hold pending MD visit [] Discharge      Electronically signed by:  Barry Al PT     Note: If patient does not return for scheduled/ recommended follow up visits, this note will serve as a discharge from care along with most recent update on progress.

## 2020-12-18 ENCOUNTER — HOSPITAL ENCOUNTER (OUTPATIENT)
Dept: PHYSICAL THERAPY | Age: 50
Setting detail: THERAPIES SERIES
Discharge: HOME | End: 2020-12-18
Payer: COMMERCIAL

## 2020-12-18 NOTE — FLOWSHEET NOTE
[facility], [address]  Phone: [phone]  Fax [phone]      Physical Therapy Treatment Note/ Progress Report:       Date:  2020    Patient Name:  Marisela Richter    :  1970  MRN: [id number]  Restrictions/Precautions:    Medical/Treatment Diagnosis Information:  · Diagnosis: S16.1xxA  Cervical Strain, Muscle Fasica, tendons  · Treatment Diagnosis: Neck Pain D13.3  Insurance/Certification information:  PT Insurance Information: [employer] $70CP, Auth after 10  Physician Information:  Referring Practitioner: Char Paredes NP;  [facility]  Has the plan of care been signed (Y/N):        []  Yes  [x]  No     Date of Patient follow up with Physician:     Is this a Progress Report:     []  Yes  [x]  No        If Yes:  Date Range for reporting period:  Beginning 10/29/20  Ending    Progress report will be due (10 Rx or 30 days whichever is less):        Recertification will be due (POC Duration  / 90 days whichever is less): 6 week POC  12/10/20       Visit # Insurance Allowable Auth Required   In-person 9 10 (then auth) [x]  Yes []  No    Telehealth   []  Yes []  No    Total        Functional Scale: NDI 66%    Date assessed:  10/29/20   Therapy Diagnosis/Practice Pattern:F      Number of Comorbidities:  []0     []1-2    [x]3+     Latex Allergy:  [x]NO      []YES  Preferred Language for Healthcare:   [x]English       []other:    Pain level:  8/10 SUBJECTIVE:  Patient reports sat at desk past 2 days for 8 hours with multiple calls. Difficulty sleeping past few nights. Increase in headaches. Using TENS unit. Using ice for control. Feels 75% back to normal function. Rest of the week has been fine. Usually prolonged sitting would no bother her. Scheduled to back and see physician but waiting to see hardware information to see if can do MRI. Feels she hit plateau. Feels shoulder is cramping. Does neck stretches. OBJECTIVE: See eval  ? Observation:   ? Test measurements:  NDI 54%     RESTRICTIONS/PRECAUTIONS: 2009 Cervical Fusion;  Ehler's Danlos Syndrome (meds), HTN (controlled), Mitral valve Prolaspse, RA, LATEX ALLERGY    Exercises/Interventions:   Therapeutic Ex        Sets/sec Reps Notes   Tband tricep ext RTB 20 x     T- band Row/pinch RTB 20 x  Latex free   T- band lower pinch RTB 20 x  Latex free    T- band ER activation with scap retract HEP   UT stretch 20\" 3 x HEP   Levator stretch 20\" 3 x HEP   SBS 5\"  10 x     Supine SA punch    Supine tricep ext    Supine PNF    Supine Horz ABD Latex free    Supine cane flexion  HEP   Chin tuck with lift  5\"  10 x     Chin tuck- supine 5\" 10 x  HEP   Supine cervical ext over towel roll HEP   Cervical supine rotation HEP   Corner stretch  N/t into the hand   Wall walks  10 x     Wall push up   15 x           Manual Intervention           Suboccipital massage/ release  X 4 min     STM to upper trap, levators, paraspinals  X 8 min     Cervical stretches  X 3 min     Thoracic spine mobs - prone   x 3 min      Shoulder right PROM    Submax Side bending manual resistance  bilat          NMR re-education                                  Traction                                     Therapeutic Exercise and NMR EXR [x] (82083) Provided verbal/tactile cueing for activities related to strengthening, flexibility, endurance, ROM  for improvements in cervical, postural, scapular, scapulothoracic and UE control with self care, reaching, carrying, lifting, house/yardwork, driving/computer work. [x] (31780) Provided verbal/tactile cueing for activities related to improving balance, coordination, kinesthetic sense, posture, motor skill, proprioception  to assist with cervical, scapular, scapulothoracic and UE control with self care, reaching, carrying, lifting, house/yardwork, driving/computer work. Therapeutic Activities:    [] (43193 or 49191) Provided verbal/tactile cueing for activities related to improving balance, coordination, kinesthetic sense, posture, motor skill, proprioception and motor activation to allow for proper function of cervical, scapular, scapulothoracic and UE control with self care, carrying, lifting, driving/computer work.      Home Exercise Program:    [x] (84168) Reviewed/Progressed HEP activities related to strengthening, flexibility, endurance, ROM of cervical, scapular, scapulothoracic and UE control with self care, reaching, carrying, lifting, house/yardwork, driving/computer work  [] (97855) Reviewed/Progressed HEP activities related to improving balance, coordination, kinesthetic sense, posture, motor skill, proprioception of cervical, scapular, scapulothoracic and UE control with self care, reaching, carrying, lifting, house/yardwork, driving/computer work      Manual Treatments:  PROM / STM / Oscillations-Mobs:  G-I, II, III, IV (PA's, Inf., Post.) [x] (95445) Provided manual therapy to mobilize soft tissue/joints of cervical/CT, scapular GHJ and UE for the purpose of decreasing headache, modulating pain, promoting relaxation,  increasing ROM, reducing/eliminating soft tissue swelling/inflammation/restriction, improving soft tissue extensibility and allowing for proper ROM for normal function with self care, reaching, carrying, lifting, house/yardwork, driving/computer work    Modalities:  PreMod to cervical spine / CP x 15 minutes prone     Charges  Timed Code Treatment Minutes: 35   Total Treatment Minutes: 50       [] EVAL (LOW) 29324   [] EVAL (MOD) 76961   [] EVAL (HIGH) 81548   [] RE-EVAL     [x] BP(29068) x  1  [] IONTO  [] NMR (71190) x     [] VASO  [x] Manual (42658) x  1    [] Other:  [] TA x  x   [] Mech Traction (62063)  [] ES(attended) (03794)      [x] ES (un) (71289):     GOALS:  Patient stated goal: Decrease in pain   []? Progressing: []? Met: []? Not Met: []? Adjusted     Therapist goals for Patient:   Short Term Goals: To be achieved in: 2 weeks  1. Independent in HEP and progression per patient tolerance, in order to prevent re-injury. []? Progressing: [x]? Met: []? Not Met: []? Adjusted  2. Patient will have a decrease in pain to facilitate improvement in movement, function, and ADLs as indicated by Functional Deficits. [x]? Progressing: []? Met: []? Not Met: []? Adjusted     Long Term Goals: To be achieved in: 6 weeks  1. Disability index score of 30% or less for the NDI to assist with reaching prior level of function. []? Progressing: []? Met: [x]? Not Met: []? Adjusted  2. Patient will demonstrate increased AROM to [facility] of cervical/thoracic spine to allow for proper joint functioning as indicated by patients Functional Deficits. []? Progressing: [x]? Met: []? Not Met: []?  Adjusted 3. Patient will demonstrate an increase in postural awareness and control and activation of  Deep cervical stabilizers to allow for proper functional mobility as indicated by patients Functional Deficits. [x]? Progressing: []? Met: []? Not Met: []? Adjusted   4. Patient will return to work and functional activities without increased symptoms or restriction. []? Progressing: []? Met: [x]? Not Met: []? Adjusted  5. Patient will sleep 6-8 hours without sleep disturbances. [x]? Progressing: []? Met: []? Not Met: []? Adjusted           Overall Progression Towards Functional goals/ Treatment Progress Update:  [x] Patient is progressing as expected towards functional goals listed. [] Progression is slowed due to complexities/Impairments listed. [] Progression has been slowed due to co-morbidities. [] Plan just implemented, too soon to assess goals progression <30days   [] Goals require adjustment due to lack of progress  [] Patient is not progressing as expected and requires additional follow up with physician  [] Other    Prognosis for POC: [x] Good [] Fair  [] Poor      Patient requires continued skilled intervention: [x] Yes  [] No      ASSESSMENT:   Patient initially was progressing this week struggling with pain while sitting at computer for prolonged period. Noticed regression in symptoms. Initially was progressing towards most all goals but this week declined. Refer back to MD at this time. Met medical necessity as compliant with home program.  May benefit from medical massage. Treatment/Activity Tolerance:  [x] Patient tolerated treatment well [] Patient limited by fatique  [x] Patient limited by pain  [] Patient limited by other medical complications  [] Other:     Prognosis: [] Good [x] Fair  [] Poor    Patient Requires Follow-up: [] Yes  [x] No    PLAN: D/c to HEP. Patient aware of home program and strengthening.     [] Continue per plan of care [] Alter current plan (see comments above)

## 2020-12-21 ENCOUNTER — APPOINTMENT (OUTPATIENT)
Dept: PHYSICAL THERAPY | Age: 50
End: 2020-12-21
Payer: COMMERCIAL

## 2021-05-11 ENCOUNTER — TELEPHONE (OUTPATIENT)
Dept: ORTHOPEDIC SURGERY | Age: 51
End: 2021-05-11

## 2021-05-11 NOTE — TELEPHONE ENCOUNTER
Scanned [facility] PT records for 8/28/2020 to present into mro for [employer]